# Patient Record
Sex: FEMALE | Race: WHITE
[De-identification: names, ages, dates, MRNs, and addresses within clinical notes are randomized per-mention and may not be internally consistent; named-entity substitution may affect disease eponyms.]

---

## 2017-01-30 ENCOUNTER — HOSPITAL ENCOUNTER (OUTPATIENT)
Dept: HOSPITAL 80 - FIMAGING | Age: 72
End: 2017-01-30
Attending: GENERAL ACUTE CARE HOSPITAL
Payer: COMMERCIAL

## 2017-01-30 DIAGNOSIS — Z12.31: Primary | ICD-10-CM

## 2017-01-30 DIAGNOSIS — Z80.3: ICD-10-CM

## 2017-01-30 PROCEDURE — G0202 SCR MAMMO BI INCL CAD: HCPCS

## 2017-01-30 NOTE — MA
Screening Digital Mammogram With iCAD Analysis



Clinical Indications: Routine screening.  A grandmother was diagnosed with breast cancer in her 60s.



Technique: Standard cephalocaudal and mediolateral oblique projections were obtained. This examinatio
n was processed by the iCAD computer aided detection system.



Comparison: January 2016, January 2015, January 2014, January 2013, January 2012, January 2011, Decem
ber 2009.



Breast density: Type B; Scattered fibroglandular densities.



Findings: CAD was reviewed.  No masses, suspicious calcifications or other signs of malignancy are id
entified.  There has been no significant change in the appearance of either breast. 



Impression: Negative mammogram. BI-RADS 1. 



Recommendation: Routine mammographic screening in one year as long as physical examination is negativ
[facility] will send a result letter to the patient.

Negative mammography should not preclude additional workup of a clinically suspicious finding.

The patient's information is entered into a reminder system with a target due date for her next mammo
gram.

## 2017-09-05 ENCOUNTER — HOSPITAL ENCOUNTER (OUTPATIENT)
Dept: HOSPITAL 80 - FED | Age: 72
Setting detail: OBSERVATION
LOS: 1 days | Discharge: HOME | End: 2017-09-06
Attending: INTERNAL MEDICINE | Admitting: INTERNAL MEDICINE
Payer: COMMERCIAL

## 2017-09-05 DIAGNOSIS — J10.1: ICD-10-CM

## 2017-09-05 DIAGNOSIS — I10: ICD-10-CM

## 2017-09-05 DIAGNOSIS — E86.0: ICD-10-CM

## 2017-09-05 DIAGNOSIS — E87.1: ICD-10-CM

## 2017-09-05 DIAGNOSIS — R55: Primary | ICD-10-CM

## 2017-09-05 DIAGNOSIS — K58.9: ICD-10-CM

## 2017-09-05 LAB
% IMMATURE GRANULYOCYTES: 0.4 % (ref 0–1.1)
ABSOLUTE IMMATURE GRANULOCYTES: 0.01 10^3/UL (ref 0–0.1)
ABSOLUTE NRBC COUNT: 0 10^3/UL (ref 0–0.01)
ADD DIFF?: NO
ADD MORPH?: NO
ADD SCAN?: NO
ANION GAP SERPL CALC-SCNC: 12 MEQ/L (ref 8–16)
ATYPICAL LYMPHOCYTE FLAG: 0 (ref 0–99)
CALCIUM SERPL-MCNC: 9.4 MG/DL (ref 8.5–10.4)
CHLORIDE SERPL-SCNC: 94 MEQ/L (ref 97–110)
CO2 SERPL-SCNC: 25 MEQ/L (ref 22–31)
CREAT SERPL-MCNC: 0.8 MG/DL (ref 0.6–1)
ERYTHROCYTE [DISTWIDTH] IN BLOOD BY AUTOMATED COUNT: 12.8 % (ref 11.5–15.2)
FRAGMENT RBC FLAG: 0 (ref 0–99)
GFR SERPL CREATININE-BSD FRML MDRD: > 60 ML/MIN/{1.73_M2}
GLUCOSE SERPL-MCNC: 92 MG/DL (ref 70–100)
HCT VFR BLD CALC: 43.8 % (ref 38–47)
HGB BLD-MCNC: 14.7 G/DL (ref 12.6–16.3)
LEFT SHIFT FLG: 0 (ref 0–99)
LIPEMIA HEMOLYSIS FLAG: 80 (ref 0–99)
MCH RBC BLDCO QN: 31.3 PG (ref 27.9–34.1)
MCHC RBC AUTO-ENTMCNC: 33.6 G/DL (ref 32.4–36.7)
MCV RBC AUTO: 93.4 FL (ref 81.5–99.8)
NRBC-AUTO%: 0 % (ref 0–0.2)
PLATELET # BLD: 157 10^3/UL (ref 150–400)
PLATELET CLUMPS FLAG: 0 (ref 0–99)
PMV BLD AUTO: 10.5 FL (ref 8.7–11.7)
POTASSIUM SERPL-SCNC: 4.2 MEQ/L (ref 3.5–5.2)
RBC # BLD AUTO: 4.69 10^6/UL (ref 4.18–5.33)
SODIUM SERPL-SCNC: 131 MEQ/L (ref 134–144)
TROPONIN I SERPL-MCNC: < 0.012 NG/ML (ref 0–0.03)

## 2017-09-05 PROCEDURE — 99285 EMERGENCY DEPT VISIT HI MDM: CPT

## 2017-09-05 PROCEDURE — 71275 CT ANGIOGRAPHY CHEST: CPT

## 2017-09-05 PROCEDURE — 93306 TTE W/DOPPLER COMPLETE: CPT

## 2017-09-05 PROCEDURE — 93005 ELECTROCARDIOGRAM TRACING: CPT

## 2017-09-05 PROCEDURE — G0378 HOSPITAL OBSERVATION PER HR: HCPCS

## 2017-09-05 PROCEDURE — 71020: CPT

## 2017-09-05 PROCEDURE — 3E0337Z INTRODUCTION OF ELECTROLYTIC AND WATER BALANCE SUBSTANCE INTO PERIPHERAL VEIN, PERCUTANEOUS APPROACH: ICD-10-PCS | Performed by: PHYSICIAN ASSISTANT

## 2017-09-05 PROCEDURE — 96360 HYDRATION IV INFUSION INIT: CPT

## 2017-09-05 NOTE — CPEKG
Heart Rate: 77

RR Interval: 779

P-R Interval: 156

QRSD Interval: 78

QT Interval: 392

QTC Interval: 444

P Axis: 73

QRS Axis: 36

T Wave Axis: 43

EKG Severity - NORMAL ECG -

EKG Impression: SINUS RHYTHM

Electronically Signed By: Mateo Tarango 05-Sep-2017 16:46:19

## 2017-09-05 NOTE — EDPHY
H & P


Smoking Status: Never smoked


Time Seen by Provider: 09/05/17 16:41


HPI/ROS: 





CHIEF COMPLAINT:  Syncope, cough





HISTORY OF PRESENT ILLNESS:  72-year-old female presents to the emergency 

department by private vehicle after having 2 syncopal episodes earlier this 

morning.  The patient states that she traveled back from [place] and got back 

late Friday night.  She states that she woke up in the middle of the night 

coughing.  She has been coughing for the last several nights.  She states this 

morning she felt like the cough was improving although when she woke up this 

morning she was feeling very weak like she needed to eat something.  She was in 

the kitchen boiling ache and she sat down on the chair and then had a syncopal 

episode.  She woke herself up off the ground and sat back in the chair and then 

apparently had another syncopal episode and when she woke up on the ground the 

2nd time, she was incontinent of stool.  She has had 2 or 3 other bowel 

movements since that time.  No diarrhea.  No fevers or chills.  She states that 

she has had bad chest cold before although she does not think she has ever had 

pneumonia.  She denies calf pain or swelling.  No history of previous pulmonary 

embolism.  She has a history of hypertension and takes lisinopril although has 

not taken this this morning.  She denies abdominal cramping.  She denies any 

chest pain.  Denies feeling short of breath.





REVIEW OF SYSTEMS:


Constitutional:  No fever, no chills.


Eyes:  No double or blurry vision.


ENT:  No sore throat.


Respiratory:  Cough as above.  no shortness of breath.


Cardiac:  No chest pain.


Gastrointestinal:  No abdominal pain, vomiting or diarrhea.


Genitourinary:  No dysuria.


Musculoskeletal:  No neck or back pain.


Skin:  No rashes.


Neurological:  No headache. (Tamara Malone)


Past Medical/Surgical History: 





Hypertension (Tamara Malone)


Social History: 





 and lives in [place] (Tamara Malone)


Physical Exam: 





General Appearance:  Alert, no distress.  94% on room air.  Afebrile.  No 

apparent distress.  Mentating normally and answering questions appropriately.


Eyes:  Pupils equal and round.  Extraocular motions are all intact.


ENT:  Mouth:  Mucous membranes moist.  No tongue abrasion or laceration.


Respiratory:  No wheezing, rhonchi, or rales, lungs are clear to auscultation.


Cardiovascular:  Regular rate and rhythm.


Gastrointestinal:  Abdomen is soft and nontender, no masses, no rebound or 

guarding, bowel sounds normal.


Neurological:  Alert and oriented x 3, cranial nerves II through XII grossly 

intact


Skin:  Warm and dry, no rashes.


Musculoskeletal:  Nontender to palpate along the cervical, thoracic or lumbar 

spine.  Neck is supple.


Extremities:  Full range of motion and no peripheral edema.


Psychiatric:  Patient is oriented X 3, there is no agitation. (Tamara Malone)


Constitutional: 


 Initial Vital Signs











Temperature (C)  36.7 C   09/05/17 16:06


 


Heart Rate  84   09/05/17 16:06


 


Respiratory Rate  20   09/05/17 16:06


 


Blood Pressure  147/98 H  09/05/17 16:06


 


O2 Sat (%)  94   09/05/17 16:06








 











O2 Delivery Mode               Room Air














Allergies/Adverse Reactions: 


 





No Known Allergies Allergy (Verified 05/26/12 10:21)


 








Home Medications: 














 Medication  Instructions  Recorded


 


Lisinopril [Zestril 20 mg (*)] 20 mg PO DAILY 09/05/17


 


Multivitamins [Multivitamin (*)] 1 each PO DAILY 09/05/17


 


amLODIPine BESYLATE [Norvasc 2.5 2.5 mg PO DAILY 09/05/17





mg (*)]  














Medical Decision Making





- Diagnostics


Imaging: Discussed imaging studies w/ On call Radiologist, I viewed and 

interpreted images myself





- Diagnostics


EKG Interpretation: 





12-lead EKG interpreted by me; official reading is in trace master.  My 

interpretation is sinus rhythm rate 77 with no acute ischemic changes. (Mateo Tarango)


Imaging Results: 


 Imaging Impressions





Chest X-Ray  09/05/17 16:47


Impression: Negative chest.








Chest/Thorax CTA  09/05/17 17:58


Impression:   


1.  Negative CT examination of the chest for acute pulmonary thromboembolic 

disease.


2. Mild lymph node prominence in the hilar regions bilaterally and subcarinal 

region. Findings are nonspecific.


3. Mild dependent atelectasis.


 


Results called to. Tamara Rich PA-C at 6:50 PM at the time of the 

interpretation. 











ED Course/Re-evaluation: 





72-year-old female presents to the emergency department after she had 2 

syncopal episodes and then was incontinent of stool.  She had recent travel to 

[place].  She has no pain or swelling in her calves.  She had elevated D-dimer 

0.98.





CT pulmonary angiogram reveals no evidence of pulmonary embolism.





EKG reveals normal sinus rhythm.  Troponin was negative.





Given that she is 72 years old, lives alone, and had 2 syncopal episodes as 

well as was incontinent of stool, she will be admitted to the hospitalist for 

observation on telemetry floor.





Case was discussed with Dr. Mateo Tarango, secondary supervising physician, who did 

not directly evaluate the patient but agrees with treatment and plan. (Tamara Malone)


Differential Diagnosis: 





Including but not limited to pulmonary embolism, pneumonia, influenza, viral 

upper respiratory infection, cardial infarction, dehydration, electrolyte 

abnormality, vasovagal syncope, arrhythmia, anemia





 (Tamara Malone)





- Data Points


Laboratory Results: 


 Laboratory Results





 09/05/17 16:55 





 09/05/17 16:55 





 











  09/05/17 09/05/17 09/05/17





  16:55 16:55 16:55


 


WBC      2.51 10^3/uL L 10^3/uL





     (3.80-9.50) 


 


RBC      4.69 10^6/uL 10^6/uL





     (4.18-5.33) 


 


Hgb      14.7 g/dL g/dL





     (12.6-16.3) 


 


Hct      43.8 % %





     (38.0-47.0) 


 


MCV      93.4 fL fL





     (81.5-99.8) 


 


MCH      31.3 pg pg





     (27.9-34.1) 


 


MCHC      33.6 g/dL g/dL





     (32.4-36.7) 


 


RDW      12.8 % %





     (11.5-15.2) 


 


Plt Count      157 10^3/uL 10^3/uL





     (150-400) 


 


MPV      10.5 fL fL





     (8.7-11.7) 


 


Neut % (Auto)      60.2 % %





     (39.3-74.2) 


 


Lymph % (Auto)      24.3 % %





     (15.0-45.0) 


 


Mono % (Auto)      13.9 % H %





     (4.5-13.0) 


 


Eos % (Auto)      0.4 % L %





     (0.6-7.6) 


 


Baso % (Auto)      0.8 % %





     (0.3-1.7) 


 


Nucleat RBC Rel Count      0.0 % %





     (0.0-0.2) 


 


Absolute Neuts (auto)      1.51 10^3/uL L 10^3/uL





     (1.70-6.50) 


 


Absolute Lymphs (auto)      0.61 10^3/uL L 10^3/uL





     (1.00-3.00) 


 


Absolute Monos (auto)      0.35 10^3/uL 10^3/uL





     (0.30-0.80) 


 


Absolute Eos (auto)      0.01 10^3/uL L 10^3/uL





     (0.03-0.40) 


 


Absolute Basos (auto)      0.02 10^3/uL 10^3/uL





     (0.02-0.10) 


 


Absolute Nucleated RBC      0.00 10^3/uL 10^3/uL





     (0-0.01) 


 


Immature Gran %      0.4 % %





     (0.0-1.1) 


 


Immature Gran #      0.01 10^3/uL 10^3/uL





     (0.00-0.10) 


 


D-Dimer    0.98 ug/mLFEU H ug/mLFEU  





    (0.00-0.50)  


 


Sodium  131 mEq/L L mEq/L    





   (134-144)   


 


Potassium  4.2 mEq/L mEq/L    





   (3.5-5.2)   


 


Chloride  94 mEq/L L mEq/L    





   ()   


 


Carbon Dioxide  25 mEq/l mEq/l    





   (22-31)   


 


Anion Gap  12 mEq/L mEq/L    





   (8-16)   


 


BUN  15 mg/dL mg/dL    





   (7-23)   


 


Creatinine  0.8 mg/dL mg/dL    





   (0.6-1.0)   


 


Estimated GFR  > 60     





    


 


Glucose  92 mg/dL mg/dL    





   ()   


 


Calcium  9.4 mg/dL mg/dL    





   (8.5-10.4)   


 


Troponin I  < 0.012 ng/mL ng/mL    





   (0.000-0.034)   











Medications Given: 


 





Guaifenesin (Mucinex)  600 mg PO BID WILLIAM


   Stop: 03/04/18 23:44


   Last Admin: 09/06/17 00:00 Dose:  600 mg


Sodium Chloride (Ns)  1,000 mls @ 125 mls/hr IV CONT WILLIAM


   Stop: 09/06/17 05:44


   Last Admin: 09/05/17 23:29 Dose:  Not Given





Discontinued Medications





Sodium Chloride (Ns)  1,000 mls @ 0 mls/hr IV ONCE ONE


   PRN Reason: Wide Open


   Stop: 09/05/17 18:07


   Last Admin: 09/05/17 18:13 Dose:  1,000 mls








Departure





- Departure


Disposition: [facility] Inpatient Acute


Clinical Impression: 


Syncope


Qualifiers:


 Syncope type: unspecified Qualified Code(s): R55 - Syncope and collapse





Condition: Good

## 2017-09-05 NOTE — GHP
[f rep st]



                                                            HISTORY AND PHYSICAL





DATE OF ADMISSION:  09/05/2017



HISTORY OF PRESENT ILLNESS:  The patient is a 72-year-old female with a history of hypertension, irr
itable bowel syndrome, presents with a syncopal episode.  She was recently hiking in [place] for a nu
mber of days.  Since returning 3 days prior to admission, developed a head cold and upper respirator
y infection with cough, sputum. 



It sounds like her p.o. intake has been poor.  Today she was just feeling really weak and tired and 
went to make herself an egg and a glass of milk and then she sat down at the table and had a syncopa
l episode.  She denies antecedent chest pain, shortness of breath, palpitations.  She has good exert
ional tolerance including recent multiple long hikes in [place] over a number of days. 



She has no family history of sudden death, no known cardiac history other than hypertension, and no 
previous episodes of syncope.  She had an episode of fecal incontinence during her 2nd syncopal epis
ode.



REVIEW OF SYSTEMS:  Complete 10-point review of systems conducted, negative except as noted in the H
PI.



PAST MEDICAL HISTORY:  Notable for irritable bowel syndrome and hypertension.



ALLERGIES:  No known drug allergies.



HOME MEDICATIONS:  Amlodipine, lisinopril, multivitamins.



SOCIAL HISTORY:  No tobacco, minimal alcohol.  Recent travel.



FAMILY HISTORY:  As in the HPI, no sudden cardiac death.  No real heart history.  Multiple cancers.



PHYSICAL EXAMINATION:  VITAL SIGNS:  Temp 37.1, blood pressure 132/82, pulse 72, breathing 18 times 
a minute, 95% on room air.  GENERAL:  No acute distress.  HEENT:  Sclerae anicteric.  Oropharynx mavis
ar.  Mucous membranes moist.  NECK:  Supple without lymphadenopathy or JVD.  LUNGS:  Clear to auscul
tation bilaterally.  HEART:  S1, S2.  ABDOMEN:  Soft, nontender, nondistended.  LOWER EXTREMITIES:  
Without edema.  Calves nontender.  SKIN:  Without rash.  NEUROLOGIC:  Nonfocal.



LABORATORY:  Sodium 131, potassium 4.2, chloride 94, bicarb 25, BUN 15, creatinine 0.8.  Troponin le
ss than 0.012.  D-dimer high at 0.98.  White count 2.5, hematocrit 43, platelets are 157,000.  She h
ad a chest x-ray, interpreted by me, shows no acute cardiopulmonary disease.  EKG shows sinus at 77 
with normal axis and intervals.  No ST or T-wave changes.  CT of the chest shows no pulmonary emboli
sm.  Mildly prominent hilar lymph nodes in the subcarinal region and in the hilar regions.  I discus
sed the case with RED Luz in the emergency department.



ASSESSMENT/PLAN:  A 72-year-old female who presents with syncope. 

1.  Syncope.  I suspect it may be orthostatic in nature.  Although the patient has really no signs o
f significant volume depletion.  Her EKG is nonischemic.  She had negative cardiac markers and no ev
idence of pulmonary embolism.  I will follow her on telemetry, cycle troponins and check an echocard
iogram in the morning.

2.  Hyponatremia, mild.  We will follow.

3.  Lymphadenopathy in the chest.  This is mild in the setting of leukopenia, is consistent with a v
iral infection.

4.  Prophylaxis.  Pharmacologic prophylaxis indicated if in the hospital longer than 24 hours.



DISPOSITION:  Observation status.





Job #:  [id number]

## 2017-09-06 VITALS
SYSTOLIC BLOOD PRESSURE: 140 MMHG | OXYGEN SATURATION: 95 % | TEMPERATURE: 97.6 F | RESPIRATION RATE: 11 BRPM | HEART RATE: 63 BPM | DIASTOLIC BLOOD PRESSURE: 91 MMHG

## 2017-09-06 RX ADMIN — GUAIFENESIN SCH MG: 600 TABLET, EXTENDED RELEASE ORAL at 00:00

## 2017-09-06 RX ADMIN — GUAIFENESIN SCH MG: 600 TABLET, EXTENDED RELEASE ORAL at 02:30

## 2017-09-06 RX ADMIN — GUAIFENESIN SCH MG: 600 TABLET, EXTENDED RELEASE ORAL at 09:49

## 2017-09-06 RX ADMIN — GUAIFENESIN SCH: 600 TABLET, EXTENDED RELEASE ORAL at 09:36

## 2017-09-06 NOTE — ECHO
[id number]

[id number]



+---------------------------------------------------+      [address]

:                                                   :       [place] [address]

:                                                   :           [phone]

+---------------------------------------------------+       Fax [phone]



                       Adult Echocardiographic Report



+----------------------------------------------------------------------------

---------+

:Name: JANIE ENAMORADO Date: 2017 10:33 AM                    

         :

:MRN: [id number]          Hospital Admission Number: [id number] Aimee vera: 201:

:: 1945          Gender: Female                         Height: 68 i

n        :

:Age: 72 yrs              Race: WH                               Weight: 136 

lb       :

:Reason For Study: Syncope with no prodrome                                  

         :

:                                                                BSA: 1.7 met

ers2     :

+----------------------------------------------------------------------------

---------+

MMode/2D Measurements & Calculations

IVSd: 0.82 cm       LVIDd: 4.4 cm      FS: 40.9 %        Ao root diam:

LVPWd: 0.81 cm      LVIDs: 2.6 cm      EDV(Teich):       3.1 cm

                                       90.1 ml           LA dimension:

                                       ESV(Teich):       3.3 cm

                                       25.3 ml

                                       EF(Teich): 71.9 %

        _____________________________________________________________



LVLd ap4: 7.4 cm    SV(MOD-sp4):

EDV(MOD-sp4):       44.0 ml

67.0 ml

LVLs ap4: 5.9 cm

ESV(MOD-sp4):

23.0 ml

EF(MOD-sp4): 65.7 %



Normal Measurement Values:

+----------------------------------------------------------------------------

----------------------------------+

:LVIDd (3.5-5.7cm)    IVSd (0.6-1.1cm)     LVPWd (0.6-1.1cm)     Aortic Root 

(2.0-3.7cm)Left Atrium (1.5-4.0cm):

:LV Vol(d) (76-115ml) LV Vol(s) (29-48ml)  Ejec Fraction (50-65%)PV Ziggy (0.6-

1.2m/s)    TV Ziggy (0.4-1.0m/s)    :

:MV E Ziggy (0.8-1.0m/s)MV A Ziggy (0.3-1.0m/s)LVOT Ziggy (0.7-1.2m/s) Asc Ao Ziggy (

0.9-1.8m/s)                       :

+----------------------------------------------------------------------------

----------------------------------+

Doppler Measurements & Calculations

MV E max ziggy: 45.9 cm/sec Ao V2 max: 100.0 cm/sec  TR max ziggy: 190.0 cm/sec

MV A max ziggy: 50.8 cm/sec Ao max P.0 mmHg      TR max P.4 mmHg

MV E/A: 0.90                                       RAP systole: 5.0 mmHg

                                                   RVSP(TR): 19.4 mmHg



Left Ventricle

The left ventricle is normal in size. There is normal left ventricular wall

thickness. Left ventricular systolic function is normal. Ejection Fraction =

65-70%. No regional wall motion abnormalities noted.





Right Ventricle

The right ventricle is normal in size and function.



Atria

The left atrial size is normal. Right atrial size is normal. The interatrial

septum is intact with no evidence for an atrial septal defect.





Mitral Valve

The mitral valve is normal in structure and function. There is no evidence

of mitral valve prolapse. There is no mitral valve stenosis. There is trace

mitral regurgitation.



Tricuspid Valve

Normal tricuspid valve. There is trace tricuspid regurgitation.



Aortic Valve

The aortic valve is trileaflet. The aortic valve opens well. There is mild

aortic valve calcification. There is no aortic stenosis. Trace aortic

regurgitation.



Pulmonic Valve

The pulmonic valve is normal in structure and function. trace to mild

pulmonic valvular regurgitation.



Great Vessels

The aortic root is normal size.





Pericardium/Pleural

Trivial anterior pericardial effusion.



Conclusion

A complete two-dimensional transthoracic echocardiogram was performed (2D,

M-mode, Doppler and color flow Doppler).

Left ventricular systolic function is normal.

Ejection Fraction = 65-70%.

Normal wall motion.

Mild aortic sclerosis.

There is trace mitral regurgitation.

There is trace tricuspid regurgitation.

Trace aortic regurgitation.

Trivial anterior pericardial effusion



_____________________________________________________________________________







Final Reading Physician:

                        Jessica Sidhu signed on 2017 01:18 PM

Ordering Physician: Toni Crespo

Performed By: Ivanna Saini, REZA

## 2017-09-06 NOTE — GDS
[f rep st]



                                                             DISCHARGE SUMMARY





DISCHARGE DIAGNOSES:  

1.  Syncope due to dehydration and probable anti-hypertensives.

2.  Acute influenza A.

3.  History of hypertension.



HISTORY:  This is a 72-year-old female, who had been sick for several days.  She presented with synco
pe.



HOSPITAL COURSE:  The patient was admitted and did have some hyponatremia, suggestive of dehydration,
 and her blood pressures were borderline low at times.  She was given IV fluids, and she felt better 
the following day.  We did send upper respiratory panel which was positive for influenza A.  Since he
r symptoms have been going on for 4 or 5 days, we opted for not treating with Tamiflu.  She also had 
an echocardiogram, which was normal.  Telemetry did not show any arrhythmias.  She will be discharged
 home today.





Job #:  [id number]

## 2018-02-19 ENCOUNTER — HOSPITAL ENCOUNTER (OUTPATIENT)
Dept: HOSPITAL 80 - FIMAGING | Age: 73
End: 2018-02-19
Attending: FAMILY MEDICINE
Payer: COMMERCIAL

## 2018-02-19 DIAGNOSIS — Z78.0: ICD-10-CM

## 2018-02-19 DIAGNOSIS — Z80.3: ICD-10-CM

## 2018-02-19 DIAGNOSIS — Z12.31: Primary | ICD-10-CM

## 2019-02-22 ENCOUNTER — HOSPITAL ENCOUNTER (OUTPATIENT)
Dept: HOSPITAL 80 - FIMAGING | Age: 74
End: 2019-02-22
Attending: FAMILY MEDICINE
Payer: COMMERCIAL

## 2019-02-22 DIAGNOSIS — Z12.31: Primary | ICD-10-CM

## 2019-02-22 DIAGNOSIS — Z80.3: ICD-10-CM
